# Patient Record
Sex: MALE | Race: WHITE
[De-identification: names, ages, dates, MRNs, and addresses within clinical notes are randomized per-mention and may not be internally consistent; named-entity substitution may affect disease eponyms.]

---

## 2017-04-19 NOTE — ED.PDOC
General


ED Provider: 


Dr. ELIO LAKHANI JR





Chief Complaint: Back Pain


Stated Complaint: hx 2 back surg--chronic back pain-stopped seeing pain 

management due to not effective-recent flare up with pain started 2 weeks ago--

no recent injury[End]97.3 80 16 97% 137/83 9/10  patient notes only 

medicationof benefit has been norco 10 no benefit from non opiates no benefit 

ultram has had MR carnes last surgery wants referral to surgeon but unable to 

see PMD


Time Seen by Physician: 10:29


Mode of Arrival: Walk-In


Information Source: Patient


Exam Limitations: No limitations


Primary Care Provider: 


LEONEL ALBERTS





Nursing and Triage Documentation Reviewed and Agree: No





Musculoskeletal Complaint Exam





- Back Pain Complaint/Exam


Mechanism of Injury: Reports: No known trauma


Symptoms Are: Worse


Timing: Intermittent


Initial Severity: Moderate


Current Severity: Moderate


Location: Reports: Diffuse


Character: Reports: Sharp, Aching


Aggravating: Reports: Movements, Lifting


Alleviating: Reports: Rest


Associated Signs and Symptoms: Reports: Numbness, Tingling.  Denies: Swelling, 

Redness, Bruising, Fever, Weakness, Abdominal pain, Flank pain, Bladder 

incontinence, Bowel incontinence, Weight loss, Pain with weight bearing


Related History: Reports: Previous back injury


TAD Risk Factors: Reports: Smoking


AAA Risk Factors: Reports: Smoking


Cauda Equina Risk Factors: Reports: Lower extremity numbness


Epidural Abcess Risk Factors: Reports: Lower extremity numbness


Related Surgical History: Reports: Back Surgery, Fusion


Focal Tenderness: No


Paraspinal Muscle Tenderness: Yes


Paraspinal Muscle Spasm: Yes


Scoliosis: No


Lordosis: No


Kyphosis: No


SLR Test: Right Negative, Left Negative


Hip Motion Testing Pain: Right Negative, Left Negative (pain nonfocal able to 

tolerate exam without difficulty)


Focal Weakness: Present: None


Focal Sensory Loss: Present: None


Gait: Present: Normal


Differential Diagnoses: Epidural Abcess, Neoplasm, Strain, Sprain





Review of Systems





- Review Of Systems


Constitutional: Reports: No symptoms


Eyes: Reports: No symptoms


Ears, Nose, Mouth, Throat: Reports: No symptoms


Respiratory: Reports: No symptoms


Cardiac: Reports: No symptoms


GI: Reports: No symptoms


: Reports: No symptoms


Musculoskeletal: Reports: Back pain, Muscle pain, Muscle stiffness


Skin: Reports: No symptoms


Neurological: Reports: No symptoms


Endocrine: Reports: No symptoms


Hematologic/Lymphatic: Reports: No symptoms


All Other Systems: Other





Past Medical History





- Past Medical History


Previously Healthy: Yes


Endocrine: Reports: None


Cardiovascular: Reports: None


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


Hematological: Reports: None


Gastrointestinal: Reports: None


Genitourinary: Reports: None


Neuro/Psych: Reports: None


Musculoskeletal: Reports: Back Pain


Cancer: Reports: Other (prostate ca-- AGE 46)





- Surgical History


General Surgical History: Reports: Back Surgery (twice), Other (prostates 

surgery)





- Family History


Family History: Reports: None





- Social History


Smoking Status: Current every day smoker, Heavy tobacco smoker


Hx Substance Use: No


Alcohol Screening: None





Physical Exam





- Physical Exam


Appearance: Well-appearing, Thin


Pain Distress: Moderate


Eyes: KALA, EOMI, Conjunctiva clear


ENT: Ears normal, Nose normal, Oropharynx normal


Neck: Supple


Respiratory: Airway patent, Breath sounds clear, Breath sounds equal, 

Respirations nonlabored


Cardiovascular: RRR, Pulses normal, No rub, No murmur


GI/: Soft, Nontender, No masses, Bowel sounds normal, No Organomegaly


Musculoskeletal: Normal strength, ROM intact, No edema, No calf tenderness


Skin: Warm, Dry, Normal color


Neurological: Sensation intact, Motor intact, Reflexes intact (complains of 

pain with SLR but non focal able to tolerate exam withou difficulty-nonfocal), 

Cranial nerves intact, Alert, Oriented


Psychiatric: Affect appropriate, Mood appropriate





Critical Care Note





- Critical Care Note


Total Time (mins): 0





Course





- Course


Orders, Labs, Meds: 


Lab Review











  04/19/17





  10:36


 


Urine Color  Yellow


 


Urine Clarity  Clear


 


Urine pH  7.0


 


Ur Specific Gravity  1.010


 


Urine Protein  Negative


 


Urine Glucose (UA)  Negative


 


Urine Ketones  Negative


 


Urine Blood  Negative


 


Urine Nitrite  Negative


 


Urine Bilirubin  Negative


 


Urine Urobilinogen  0.2


 


Ur Leukocyte Esterase  Negative








Orders











 Category Date Time Status


 


 URINALYSIS C & S IF INDICATED Stat LAB  04/19/17 10:36 Completed


 


 CT LUMBAR SPINE W/O CONTRAST Stat RADS  04/19/17 10:49 Completed











Vital Signs: 


 











  Temp Pulse Resp BP Pulse Ox


 


 04/19/17 09:49  97.3 F L  80  16  137/83  97














Departure





- Departure


Time of Disposition: 11:33


Disposition: HOME SELF-CARE


Discharge Problem: 


 Acute exacerbation of chronic low back pain





Instructions:  Lower Back Exercises (ED), Chronic Back Pain (ED), Acute Low 

Back Pain (ED), Low Back Strain (ED)


Condition: Good


Pt referred to PMD for follow-up: Yes


Additional Instructions: 


Naprosyn for pain recommend use for three to five days for any inflammation


Norco for pain not controlled


should resolve with medications and stretching exercises


follow up with PMD discuss specialty referral or MRI if indicated


return if fever over 101.0, if worsening


Prescriptions: 


Hydrocodone Bit/Acetaminophen [Norco 5-325] 1 - 2 tab PO Q6HR PRN #12 tablet


 PRN Reason: pain


Naproxen [Naprosyn] 500 mg PO Q12HR PRN #30 tablet


 PRN Reason: PAIN


Allergies/Adverse Reactions: 


Allergies





No Known Allergies Allergy (Unverified 04/19/17 09:55)


 








Home Medications: 


Ambulatory Orders





Hydrocodone Bit/Acetaminophen [Norco 5-325] 1 - 2 tab PO Q6HR PRN #12 tablet 04/ 19/17 


Lansoprazole [Prevacid] 30 mg PO DAILY 04/19/17 


Naproxen [Naprosyn] 500 mg PO Q12HR PRN #30 tablet 04/19/17

## 2017-04-19 NOTE — CT
EXAM:  CT of the lumbar spine without contrast 

  

History:  Worsening back pain. 

  

Comparison:  None available. 

  

Technique:  Multiplanar CT images through the lumbar spine were obtained without the administration 
of IV contrast 

  

Findings:  No acute fracture or subluxation.  Anterior fusion of L5-S1 with grossly intact hardware.
  Mild to moderate degenerative disc disease at L2-L3 with endplate sclerosis and osteophyte formati
on with some vacuum disc phenomenon. 

  

T12-L1: No significant disc bulge, central canal stenosis or bony neural foraminal narrowing. 

  

L1-L2:  No significant disc bulge, central canal stenosis or bony neural foraminal narrowing. 

  

L2-L3: No significant disc bulge, central canal stenosis or bony neural foraminal narrowing. 

  

L3-L4: No significant disc bulge, central canal stenosis or bony neural foraminal narrowing. 

  

L4-L5:  Small disc bulge effacing the anterior thecal sac with no significant central canal stenosis
 or bony neural foraminal narrowing. 

  

L5-S1:  No significant bony central canal stenosis.  Mild to moderate bilateral bony neural foramina
l narrowing secondary to facet hypertrophy. 

  

Impression:  No acute osseous abnormality of the lumbar spine.  Other findings as detailed above.

## 2017-04-26 NOTE — ED.PDOC
General


ED Provider: 


Dr. KAMI CASTRO





Chief Complaint: Back Pain


Stated Complaint: low back pain


Time Seen by Physician: 09:45


Mode of Arrival: Walk-In


Information Source: Patient


Exam Limitations: No limitations


Primary Care Provider: 


LEONEL ALBERTS





Nursing and Triage Documentation Reviewed and Agree: Yes





Musculoskeletal Complaint Exam





- Back Pain Complaint/Exam


Mechanism of Injury: Reports: No known trauma


Onset/Duration:  chronic 


Symptoms Are: Still present


Timing: Intermittent


Episodes Lasting: Days


Initial Severity: Moderate


Current Severity: Moderate


Character: Reports: Throbbing, Spasmodic, Stiffness


Aggravating: Reports: Movements, Lifting, Bending, Walking


Alleviating: Reports: Rest, Position


Associated Signs and Symptoms: Denies: Swelling, Redness, Bruising, Fever, 

Weakness, Numbness, Tingling, Abdominal pain, Flank pain, Bladder incontinence, 

Bowel incontinence, Weight loss, Pain with weight bearing


Related History: Reports: Similar episode


TAD Risk Factors: Reports: None


AAA Risk Factors: Reports: None


Cauda Equina Risk Factors: Reports: None


Epidural Abcess Risk Factors: Reports: None


Related Surgical History: Reports: None


Focal Tenderness: No


Paraspinal Muscle Tenderness: No


Paraspinal Muscle Spasm: No


Scoliosis: No


Lordosis: No


Kyphosis: No


SLR Test: Right Negative, Left Negative


Hip Motion Testing Pain: Right Negative, Left Negative


Focal Weakness: Present: None


Focal Sensory Loss: Present: None


Gait: Present: Normal


Differential Diagnoses: Strain, Sprain





Review of Systems





- Review Of Systems


Constitutional: Reports: No symptoms


Eyes: Reports: No symptoms


Ears, Nose, Mouth, Throat: Reports: No symptoms


Respiratory: Reports: No symptoms


Cardiac: Reports: No symptoms


GI: Reports: No symptoms


: Reports: No symptoms


Musculoskeletal: Reports: Back pain


Skin: Reports: No symptoms


Neurological: Reports: No symptoms


Endocrine: Reports: No symptoms


Hematologic/Lymphatic: Reports: No symptoms


All Other Systems: Reviewed and Negative





Past Medical History





- Past Medical History


Previously Healthy: Yes


Endocrine: Reports: None


Cardiovascular: Reports: None


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


Hematological: Reports: None


Gastrointestinal: Reports: None


Genitourinary: Reports: None


Neuro/Psych: Reports: None


Musculoskeletal: Reports: Back Pain


Cancer: Reports: Other (prostate ca-- AGE 46)


Other Pertinent Past Medical History: back surg--prostate ca--TURBURCULOSIS AT 

AGE 4--chronic back paincance





- Surgical History


General Surgical History: Reports: Back Surgery (twice), Other (prostates 

surgery)





- Family History


Family History: Reports: None





- Social History


Smoking Status: Current every day smoker


Hx Substance Use: No


Alcohol Screening: None





Physical Exam





- Physical Exam


Appearance: Well-appearing, No pain distress, Well-nourished


Eyes: KALA, EOMI, Conjunctiva clear


ENT: Ears normal, Nose normal, Oropharynx normal


Respiratory: Airway patent, Breath sounds clear, Breath sounds equal, 

Respirations nonlabored


Cardiovascular: RRR, Pulses normal, No rub, No murmur


GI/: Soft, Nontender, No masses, Bowel sounds normal, No Organomegaly


Musculoskeletal: Normal strength, ROM intact, No edema, No calf tenderness


Skin: Warm, Dry, Normal color


Neurological: Sensation intact, Motor intact, Reflexes intact, Cranial nerves 

intact, Alert, Oriented


Psychiatric: Affect appropriate, Mood appropriate





Critical Care Note





- Critical Care Note


Total Time (mins): 0





Course





- Course


Vital Signs: 





 











  Temp Pulse Resp BP Pulse Ox


 


 04/26/17 09:43  96.7 F L  79  20  148/89 H  98














Departure





- Departure


Time of Disposition: 10:02


Disposition: HOME SELF-CARE


Discharge Problem: 


 Backache





Instructions:  Back Pain (ED), Chronic Back Pain (ED)


Condition: Good


Pt referred to PMD for follow-up: No


Additional Instructions: 


Please call your Family Physician as soon as possible to schedule a follow-up 

appointment.


Allergies/Adverse Reactions: 


Allergies





No Known Allergies Allergy (Verified 04/26/17 09:50)


 








Home Medications: 


Ambulatory Orders





Lansoprazole [Prevacid] 30 mg PO DAILY 04/19/17 


Naproxen [Naprosyn] 500 mg PO Q12HR PRN #30 tablet 04/19/17

## 2017-05-08 NOTE — ED.PDOC
General


ED Provider: 


Dr. KAMI CASTRO





Chief Complaint: Back Pain


Stated Complaint: back pain


Time Seen by Physician: 15:30


Mode of Arrival: Walk-In


Information Source: Patient


Exam Limitations: No limitations


Primary Care Provider: 


LEONEL ALBERTS





Nursing and Triage Documentation Reviewed and Agree: Yes





Musculoskeletal Complaint Exam





- Back Pain Complaint/Exam


Mechanism of Injury: Reports: No known trauma


Onset/Duration: chronic


Symptoms Are: Still present


Timing: Intermittent


Initial Severity: Moderate


Current Severity: Moderate


Character: Reports: Aching


Aggravating: Reports: None


Alleviating: Reports: None


Associated Signs and Symptoms: Denies: Swelling, Redness, Bruising, Fever, 

Weakness, Numbness, Tingling, Abdominal pain, Flank pain, Bladder incontinence, 

Bowel incontinence, Weight loss, Pain with weight bearing


Related History: Reports: Similar episode


TAD Risk Factors: Reports: None


AAA Risk Factors: Reports: None


Cauda Equina Risk Factors: Reports: None


Epidural Abcess Risk Factors: Reports: None


Related Surgical History: Reports: None


Focal Tenderness: No


SLR Test: Right Negative, Left Negative


Hip Motion Testing Pain: Right Negative, Left Negative


Focal Weakness: Present: None


Focal Sensory Loss: Present: None


Gait: Present: Normal


Differential Diagnoses: Strain, Sprain





Review of Systems





- Review Of Systems


Constitutional: Reports: No symptoms


Eyes: Reports: No symptoms


Ears, Nose, Mouth, Throat: Reports: No symptoms


Respiratory: Reports: No symptoms


Cardiac: Reports: No symptoms


GI: Reports: No symptoms


: Reports: No symptoms


Musculoskeletal: Reports: Back pain


Skin: Reports: No symptoms


Neurological: Reports: No symptoms


Endocrine: Reports: No symptoms


Hematologic/Lymphatic: Reports: No symptoms


All Other Systems: Reviewed and Negative





Past Medical History





- Past Medical History


Previously Healthy: Yes


Endocrine: Reports: None


Cardiovascular: Reports: None


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


Hematological: Reports: None


Gastrointestinal: Reports: None


Genitourinary: Reports: None


Neuro/Psych: Reports: None


Musculoskeletal: Reports: Back Pain


Cancer: Reports: Other (prostate ca-- AGE 46)


Other Pertinent Past Medical History: back surg--prostate ca--TURBURCULOSIS AT 

AGE 4--chronic back paincance





- Surgical History


General Surgical History: Reports: Back Surgery (twice), Other (prostates 

surgery)





- Family History


Family History: Reports: None





- Social History


Smoking Status: Current every day smoker


Hx Substance Use: No


Alcohol Screening: None





Physical Exam





- Physical Exam


Appearance: Well-appearing, No pain distress, Well-nourished


Eyes: KALA, EOMI, Conjunctiva clear


ENT: Ears normal, Nose normal, Oropharynx normal


Respiratory: Airway patent, Breath sounds clear, Breath sounds equal, 

Respirations nonlabored


Cardiovascular: RRR, Pulses normal, No rub, No murmur


GI/: Soft, Nontender, No masses, Bowel sounds normal, No Organomegaly


Musculoskeletal: Normal strength, ROM intact, No edema, No calf tenderness


Skin: Warm, Dry, Normal color


Neurological: Sensation intact, Motor intact, Reflexes intact, Cranial nerves 

intact, Alert, Oriented


Psychiatric: Affect appropriate, Mood appropriate





Critical Care Note





- Critical Care Note


Total Time (mins): 0





Course





- Course


Vital Signs: 





 











  Temp Pulse Resp BP Pulse Ox


 


 05/08/17 15:24  97.9 F  88  18  124/89  97














Departure





- Departure


Time of Disposition: 15:39


Disposition: HOME SELF-CARE


Discharge Problem: 


Back pain


Qualifiers:


 Back pain location: low back pain Back pain laterality: midline Sciatica 

presence: without sciatica 





Instructions:  Chronic Back Pain (ED)


Condition: Good


Pt referred to PMD for follow-up: No


Additional Instructions: 


Please call your Family Physician as soon as possible to schedule a follow-up 

appointment.


Prescriptions: 


Hydrocodone/Acetaminophen [Norco 5-325 Tablet] 1 each PO Q6HR PRN #20 tablet


 PRN Reason: PAIN


Allergies/Adverse Reactions: 


Allergies





No Known Allergies Allergy (Verified 05/08/17 15:22)


 








Home Medications: 


Ambulatory Orders





Lansoprazole [Prevacid] 30 mg PO DAILY 04/19/17 


Naproxen [Naprosyn] 500 mg PO Q12HR PRN #30 tablet 04/19/17 


Hydrocodone/Acetaminophen [Norco 5-325 Tablet] 1 each PO Q6HR PRN #20 tablet 05/ 08/17

## 2017-05-14 NOTE — ED.PDOC
General


ED Provider: 


Dr. BARBARA MARC





Chief Complaint: Back Pain


Stated Complaint: Has chronic back pain, he is not going to the pain management.


Time Seen by Physician: 08:52


Mode of Arrival: Walk-In


Information Source: Patient


Primary Care Provider: 


LEONEL ALBERTS





Nursing and Triage Documentation Reviewed and Agree: Yes





Musculoskeletal Complaint Exam





- Back Pain Complaint/Exam


Mechanism of Injury: Reports: No known trauma


Symptoms Are: Still present


Timing: Constant


Initial Severity: Moderate


Current Severity: Moderate


Location: Reports: Discrete


Character: Reports: Aching, Throbbing


Aggravating: Reports: Movements, Lifting


Alleviating: Reports: None


Associated Signs and Symptoms: Denies: Swelling, Redness, Bruising, Fever, 

Weakness, Numbness, Tingling, Abdominal pain, Flank pain, Bladder incontinence, 

Bowel incontinence, Weight loss, Pain with weight bearing


Related History: Reports: Similar episode


TAD Risk Factors: Reports: None


AAA Risk Factors: Reports: None


Cauda Equina Risk Factors: Reports: None


Epidural Abcess Risk Factors: Reports: None


Related Surgical History: Reports: None


Focal Tenderness: Yes


Paraspinal Muscle Tenderness: Yes


Paraspinal Muscle Spasm: Yes


Scoliosis: No


Lordosis: No


Kyphosis: No


SLR Test: Right Negative, Left Negative


Hip Motion Testing Pain: Right Negative, Left Negative


Focal Weakness: Present: None


Focal Sensory Loss: Present: None


Gait: Present: Normal


Differential Diagnoses: Arthritis, Strain





Review of Systems





- Review Of Systems


Constitutional: Reports: No symptoms


Eyes: Reports: No symptoms


Ears, Nose, Mouth, Throat: Reports: No symptoms


Respiratory: Reports: No symptoms


Cardiac: Reports: No symptoms


GI: Reports: No symptoms


: Reports: No symptoms


Musculoskeletal: Reports: Back pain


Skin: Reports: No symptoms


Neurological: Reports: No symptoms


Endocrine: Reports: No symptoms


Hematologic/Lymphatic: Reports: No symptoms


All Other Systems: Reviewed and Negative





Past Medical History





- Past Medical History


Previously Healthy: Yes


Endocrine: Reports: None


Cardiovascular: Reports: None


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


Hematological: Reports: None


Gastrointestinal: Reports: None


Genitourinary: Reports: None


Neuro/Psych: Reports: None


Musculoskeletal: Reports: Back Pain


Cancer: Reports: Other (prostate ca-- AGE 46)


Other Pertinent Past Medical History: back surg--prostate ca--TURBURCULOSIS AT 

AGE 4--chronic back paincance





- Surgical History


General Surgical History: Reports: Back Surgery (twice), Other (prostates 

surgery)





- Family History


Family History: Reports: None





- Social History


Smoking Status: Current every day smoker, Heavy tobacco smoker


Smoking Cessation Counseling Time: > 3 min - 10 min


Hx Substance Use: No


Alcohol Screening: None





- Immunizations


Tetanus Shot up to Date: Yes





Physical Exam





- Physical Exam


Appearance: Well-appearing, No pain distress, Well-nourished


Eyes: KALA, EOMI, Conjunctiva clear


ENT: Ears normal, Nose normal, Oropharynx normal


Respiratory: Airway patent, Breath sounds clear, Breath sounds equal, 

Respirations nonlabored


Cardiovascular: RRR, Pulses normal, No rub, No murmur


GI/: Soft, Nontender, No masses, Bowel sounds normal, No Organomegaly


Musculoskeletal: Normal strength, ROM intact, No edema, No calf tenderness


Skin: Warm, Dry, Normal color


Neurological: Sensation intact, Motor intact, Reflexes intact, Cranial nerves 

intact, Alert, Oriented


Psychiatric: Affect appropriate, Mood appropriate





Critical Care Note





- Critical Care Note


Total Time (mins): 0





Course





- Course


Orders, Labs, Meds: 





Orders











 Category Date Time Status


 


 Ketorolac Tromethamine [Toradol] MEDS  05/14/17 08:51 Stat





 30 mg IM ONCE STA   








Medications











Generic Name Dose Route Start Last Admin





  Trade Name Freq  PRN Reason Stop Dose Admin


 


Ketorolac Tromethamine  30 mg  05/14/17 08:51  





  Toradol  IM  05/14/17 08:52  





  ONCE STA   











Vital Signs: 





 











  Temp Pulse Resp BP Pulse Ox


 


 05/14/17 08:32  97.5 F L  77  16  149/85 H  97














Departure





- Departure


Time of Disposition: 08:59


Disposition: HOME SELF-CARE


Discharge Problem: 


 Backache





Instructions:  Lumbar Radiculopathy (ED)


Condition: Stable


Pt referred to PMD for follow-up: Yes (spine surgeon)


Additional Instructions: 


Patient has f/u with spine surgeon.


rest


hot pack





Prescriptions: 


Prednisone 5 mg PO BIDWM #14 tablet


Tramadol HCl 50 mg PO BID #14 tablet


Allergies/Adverse Reactions: 


Allergies





No Known Allergies Allergy (Verified 05/14/17 08:39)


 








Home Medications: 


Ambulatory Orders





Lansoprazole [Prevacid] 30 mg PO DAILY 04/19/17 


Naproxen [Naprosyn] 500 mg PO Q12HR PRN #30 tablet 04/19/17 


Prednisone 5 mg PO BIDWM #14 tablet 05/14/17 


Tramadol HCl 50 mg PO BID #14 tablet 05/14/17 








Disposition Discussed With: Patient

## 2018-07-11 ENCOUNTER — HOSPITAL ENCOUNTER (EMERGENCY)
Dept: HOSPITAL 58 - ED | Age: 53
Discharge: HOME | End: 2018-07-11
Payer: COMMERCIAL

## 2018-07-11 VITALS — TEMPERATURE: 97.6 F | SYSTOLIC BLOOD PRESSURE: 145 MMHG | DIASTOLIC BLOOD PRESSURE: 82 MMHG

## 2018-07-11 VITALS — BODY MASS INDEX: 24.3 KG/M2

## 2018-07-11 DIAGNOSIS — S83.411A: Primary | ICD-10-CM
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
## 2018-07-11 DIAGNOSIS — R10.30: ICD-10-CM

## 2018-07-11 DIAGNOSIS — F17.210: ICD-10-CM

## 2018-07-11 PROCEDURE — 99283 EMERGENCY DEPT VISIT LOW MDM: CPT

## 2018-07-11 PROCEDURE — 81001 URINALYSIS AUTO W/SCOPE: CPT

## 2018-07-11 PROCEDURE — 96372 THER/PROPH/DIAG INJ SC/IM: CPT

## 2018-07-11 NOTE — ED.PDOC
General


ED Provider: 


Dr. PATRICIO NICHOLAS





Chief Complaint: Knee Pain/Injury


Stated Complaint: patient is a 52 year old male who states he has had right 

knee pain for the past 10 days after he bent it. Has pain with weight bearing. 

Has Appointment with PCP in one week.  pain got worse today. Also complains of 

right upper inguinal area pain for the past few days that is not associated 

with bowel movement. States he has been lifting alot.


Time Seen by Physician: 19:10


Mode of Arrival: Walk-In


Information Source: Patient


Exam Limitations: No limitations


Primary Care Provider: 


LEONEL ALBERTS





Nursing and Triage Documentation Reviewed and Agree: Yes


Does patient meet sepsis criteria?: No


System Inflammatory Response Syndrome: Not Applicable


Sepsis Protocol: 


For patient's 13 years and over:





Temp is 96.8 and below  and greater


Pulse >90 BPM


Resp >20/minute


Acutely Altered Mental Status





Are patient's symptoms suggestive of a new infection, such as:


   -Pneumonia


   -Skin, Soft Tissue


   -Endocarditis


   -UTI


   -Bone, Joint Infection


   -Implantable Device


   -Acute Abdominal Infection


   -Wound Infection


   -Meningitis


   -Blood Stream Catheter Infection


   -Unknown








Musculoskeletal Complaint Exam





- Knee Pain Complaint/Exam


Mechanism of Injury: Reports: Trauma (from squating )


Onset/Duration: 3 days ago 


Symptoms Are: Still present


Onset of Pain: Reports: Immediate


Initial Severity: Moderate


Current Severity: Moderate


Location: Reports: Discrete (Medial and posterior aspect of the knee. )


Character: Reports: Aching, Throbbing


Aggravating: Reports: Movement, Weight bearing, Prolonged standing


Able to Bear Weight: Yes


Related History: Reports: Occupational injury (but does not want to claim it as 

such ).  Denies: Similar episode


Septic Arthritis Risk Factors: Reports: None


Gout Risk Factors: Reports: None


Knee Findings: Present: Tenderness (Tenderness to palpation right medical 

colateral ligament. ).  Absent: Rotation, Ligamentous instability





Review of Systems





- Review Of Systems


Constitutional: Reports: No symptoms


Eyes: Reports: No symptoms


Ears, Nose, Mouth, Throat: Reports: No symptoms


Respiratory: Reports: No symptoms


Cardiac: Reports: No symptoms


GI: Reports: Abdominal pain (Right inguinal area.)


: Reports: No symptoms


Musculoskeletal: Reports: Joint pain (Right knee )


Skin: Reports: No symptoms


Neurological: Reports: No symptoms


Endocrine: Reports: No symptoms


Hematologic/Lymphatic: Reports: No symptoms


All Other Systems: Reviewed and Negative





Past Medical History





- Past Medical History


Previously Healthy: Yes


Endocrine: Reports: None


Cardiovascular: Reports: None


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


Hematological: Reports: None


Gastrointestinal: Reports: None


Genitourinary: Reports: None


Neuro/Psych: Reports: None


Musculoskeletal: Reports: Back Pain


Cancer: Reports: Other (prostate ca-- AGE 46)


Other Pertinent Past Medical History: back surg--prostate ca--TURBURCULOSIS AT 

AGE 4--chronic back paincance





- Surgical History


General Surgical History: Reports: Back Surgery (twice), Other (prostates 

surgery)





- Family History


Family History: Reports: None





- Social History


Smoking Status: Current every day smoker, Heavy tobacco smoker


Hx Substance Use: No


Alcohol Screening: None





Physical Exam





- Physical Exam


Appearance: Well-appearing


Ill-appearing: Moderate


Pain Distress: Moderate


Neck: Supple


Respiratory: Airway patent, Breath sounds clear, Breath sounds equal, 

Respirations nonlabored


Cardiovascular: RRR, Pulses normal, No rub, No murmur


GI/: Tender (Right inguinal area. No evidence of hernia or incarceration )


Musculoskeletal: Normal strength, ROM intact (Tenderness to palpation right 

medical colateral ligament. )


Skin: Warm, Dry, Normal color


Neurological: Sensation intact, Motor intact, Reflexes intact, Cranial nerves 

intact, Alert, Oriented





Interpretation





- Radiology Interpretation


Radiology Interpretation By: ED Physician


Radiology Results: Negative


Exam Interpreted: Other (Knee x ray right )


Radiology Interpretation By: Radiologist


Radiology Results: Negative


Exam Interpreted: CT Scan (CT pelvis )





Critical Care Note





- Critical Care Note


Total Time (mins): 0





Course





- Course


Orders, Labs, Meds: 


Lab Review











  07/11/18





  19:45


 


Urine Color  Yellow


 


Urine Clarity  Clear


 


Urine pH  6.0


 


Ur Specific Gravity  1.020


 


Urine Protein  Negative


 


Urine Glucose (UA)  Negative


 


Urine Ketones  Negative


 


Urine Blood  Negative


 


Urine Nitrite  Negative


 


Urine Bilirubin  Negative


 


Urine Urobilinogen  0.2


 


Ur Leukocyte Esterase  Negative








Orders











 Category Date Time Status


 


 ACE [ED ACE WRAP] .ONCE EMERGENCY  07/11/18 19:35 Active


 


 URINALYSIS C & S IF INDICATED Stat LAB  07/11/18 19:45 Completed


 


 Ketorolac Tromethamine [Toradol] MEDS  07/11/18 19:41 Discontinued





 60 mg IM ONCE STA   


 


 CT PELVIS W/O CONTRAST Stat RADS  07/11/18 19:34 Taken


 


 KNEE, RIGHT 4 VIEWS Stat RADS  07/11/18 19:07 Taken








Medications














Discontinued Medications














Generic Name Dose Route Start Last Admin





  Trade Name Freq  PRN Reason Stop Dose Admin


 


Ketorolac Tromethamine  60 mg  07/11/18 19:41  07/11/18 19:52





  Toradol  IM  07/11/18 19:42  60 mg





  ONCE STA   Administration





     





     





     





     











Vital Signs: 


 











  Temp Pulse Resp BP Pulse Ox


 


 07/11/18 18:57  97.6 F  84  20  145/82 H  98














Departure





- Departure


Time of Disposition: 20:13


Disposition: HOME SELF-CARE


Discharge Problem: 


Right knee sprain


Qualifiers:


 Encounter type: initial encounter Involved ligament of knee: medial collateral 

ligament Qualified Code(s): S83.411A - Sprain of medial collateral ligament of 

right knee, initial encounter





Instructions:  Knee Sprain (ED)


Condition: Stable


Pt referred to PMD for follow-up: Yes


IPMP verified?: No


Additional Instructions: 


Take Medications as prescribed 


Follow up with PCP as scheduled 


Prescriptions: 


Tramadol HCl [Ultram] 50 mg PO Q6H PRN #14 tablet


 PRN Reason: Severe Pain


Allergies/Adverse Reactions: 


Allergies





No Known Allergies Allergy (Verified 07/11/18 19:05)


 








Home Medications: 


Ambulatory Orders





Tramadol HCl [Ultram] 50 mg PO Q6H PRN #14 tablet 07/11/18 








Disposition Discussed With: Patient, Family

## 2018-07-11 NOTE — CT
Exam:  CT scan of the pelvis without contrast. 

  

Date:  07/11/2018. 

  

Comparison:  None. 

  

HISTORY:  Right lower quadrant and knee pain. 

  

TECHNIQUE:  Helical scan of the pelvis was performed without contrast. 

  

FINDINGS:  The study began just above the iliac crests.  The soft tissue and musculature overlying th
e bony pelvis are normal.  The small bowel within the lower abdomen and pelvis appear normal.  The ap
pendix and colon within the lower abdomen pelvis is normal.  The pelvic sidewall is normal.  The blad
daphnie and rectum are normal.  There is no free pelvic fluid.  Inguinal regions are normal. 

  

In L5-S1 anterior interbody fusion is present.  The bony pelvis and left hip are normal.  There is tucker
bchondral cysts along the medial margin of the right acetabulum. 

  

Impression:  No acute findings in the pelvis. 

  

Degenerative changes in the right hip.

## 2018-07-12 NOTE — DI
EXAM:  Radiographs, right knee 

  

HISTORY:  Right knee pain. 

  

COMPARISON:  None available. 

  

TECHNIQUE:  Four views. 

  

  

FINDINGS:  Bone mineralization is normal.  There is no fracture or dislocation.  The joint spaces are
 maintained.  No focal soft tissue abnormality is seen. 

  

---------------------------- 

IMPRESSION: 

No fracture or dislocation.

## 2018-07-26 ENCOUNTER — HOSPITAL ENCOUNTER (EMERGENCY)
Dept: HOSPITAL 58 - ED | Age: 53
Discharge: HOME | End: 2018-07-26

## 2018-07-26 VITALS — TEMPERATURE: 97.6 F | SYSTOLIC BLOOD PRESSURE: 150 MMHG | DIASTOLIC BLOOD PRESSURE: 91 MMHG

## 2018-07-26 VITALS — BODY MASS INDEX: 23.5 KG/M2

## 2018-07-26 DIAGNOSIS — F17.210: ICD-10-CM

## 2018-07-26 DIAGNOSIS — T15.92XA: Primary | ICD-10-CM

## 2018-07-26 PROCEDURE — 99282 EMERGENCY DEPT VISIT SF MDM: CPT

## 2018-07-26 NOTE — ED.PDOC
General


ED Provider: 


Dr. LEONEL ALBERTS-ER





Chief Complaint: Eye Problem


Stated Complaint: i was grinding and i got something in my eye


Time Seen by Physician: 23:01


Mode of Arrival: Walk-In


Information Source: Patient


Exam Limitations: No limitations


Primary Care Provider: 


LEONEL ALBERTS





Nursing and Triage Documentation Reviewed and Agree: Yes


Does patient meet sepsis criteria?: No


System Inflammatory Response Syndrome: Not Applicable


Sepsis Protocol: 


For patient's 13 years and over:





Temp is 96.8 and below  and greater


Pulse >90 BPM


Resp >20/minute


Acutely Altered Mental Status





Are patient's symptoms suggestive of a new infection, such as:


   -Pneumonia


   -Skin, Soft Tissue


   -Endocarditis


   -UTI


   -Bone, Joint Infection


   -Implantable Device


   -Acute Abdominal Infection


   -Wound Infection


   -Meningitis


   -Blood Stream Catheter Infection


   -Unknown








EENT Complaint Exam





- Eye Complaint/Exam


Onset/Duration: a few hours


Symptoms Are: Still present


Timing: Constant


Initial Severity: Mild


Current Severity: Mild


Location: Left


Character: Reports: Dull, Throbbing, Foreign body sensation


Aggravating: Reports: Blinking


Alleviating: Reports: None


Associated Signs and Symptoms: Reports: Clear drainage


Related History: Reports: Foreign body


Eye Surgical History: Reports: None


Penetrating Injury Risk Factors: Grinding


Globe Rupture Risk Factors: None


Optic Artery Occlusion Risk Factors: None


Visual Field: Normal


Extraocular Movement: Normal


Orbit Findings: Normal


Globe Findings: Intact


Lid Findings: Normal


Corneal Findings: Clear


Fundi: Normal


Slit Lamp Used: No


Differential Diagnoses: Foreign Body





Review of Systems





- Review Of Systems


Constitutional: Reports: No symptoms


Eyes: Reports: Foreign body sensation, Inflammation, Pain


Ears, Nose, Mouth, Throat: Reports: No symptoms


Respiratory: Reports: No symptoms


Cardiac: Reports: No symptoms


GI: Reports: No symptoms


: Reports: No symptoms


Musculoskeletal: Reports: No symptoms


Skin: Reports: No symptoms


Neurological: Reports: No symptoms


Endocrine: Reports: No symptoms


Hematologic/Lymphatic: Reports: No symptoms


All Other Systems: Reviewed and Negative





Past Medical History





- Past Medical History


Previously Healthy: Yes


Endocrine: Reports: None


Cardiovascular: Reports: None


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


Hematological: Reports: None


Gastrointestinal: Reports: None


Genitourinary: Reports: None


Neuro/Psych: Reports: None


Musculoskeletal: Reports: Back Pain


Cancer: Reports: Other (prostate ca-- AGE 46)


Other Pertinent Past Medical History: back surg--prostate ca--TURBURCULOSIS AT 

AGE 4--chronic back paincance





- Surgical History


General Surgical History: Reports: Back Surgery (twice), Other (prostates 

surgery)





- Family History


Family History: Reports: None





- Social History


Smoking Status: Current every day smoker, Heavy tobacco smoker


Hx Substance Use: No


Alcohol Screening: None





- Immunizations


Tetanus Shot up to Date: Yes





Physical Exam





- Physical Exam


Appearance: Well-appearing


Pain Distress: Mild


Eyes: KALA, EOMI


ENT: Ears normal, Nose normal, Oropharynx normal


Neck: Supple


Respiratory: Airway patent, Breath sounds clear, Breath sounds equal, 

Respirations nonlabored


Cardiovascular: RRR, Pulses normal, No rub, No murmur


GI/: Soft, Nontender, No masses, Bowel sounds normal, No Organomegaly


Musculoskeletal: Normal strength, ROM intact, No edema, No calf tenderness


Skin: Warm, Dry, Normal color


Neurological: Sensation intact, Motor intact, Reflexes intact, Cranial nerves 

intact, Alert, Oriented


Psychiatric: Affect appropriate, Mood appropriate





Critical Care Note





- Critical Care Note


Total Time (mins): 0





Course





- Course


Orders, Labs, Meds: 





Orders











 Category Date Time Status


 


 Balanced Salt Solution [Eye-Stream] MEDS  07/26/18 22:46 Discontinued





 1 bottle OP ONCE STA   


 


 Tetracaine HCl/Pf [Tetracaine 0.5% Unit-Dose] MEDS  07/26/18 22:46 Discontinued





 2 drop OP ONCE STA   








Medications














Discontinued Medications














Generic Name Dose Route Start Last Admin





  Trade Name Freq  PRN Reason Stop Dose Admin


 


Eye Irrigation Solution  1 bottle  07/26/18 22:46  





  Eye-Stream  OP  07/26/18 22:47  





  ONCE STA   





     





     





     





     


 


Tetracaine HCl  2 drop  07/26/18 22:46  





  Tetracaine 0.5% Unit-Dose  OP  07/26/18 22:47  





  ONCE STA   





     





     





     





     











Vital Signs: 





 











  Temp Pulse Resp BP Pulse Ox


 


 07/26/18 22:45  97.6 F  79  20  150/91 H  98














Departure





- Departure


Time of Disposition: 23:03


Disposition: HOME SELF-CARE


Discharge Problem: 


Foreign body, eye


Qualifiers:


 Encounter type: initial encounter Laterality: left Qualified Code(s): T15.92XA 

- Foreign body on external eye, part unspecified, left eye, initial encounter





Instructions:  Eye Foreign Body (ED)


Condition: Good


Pt referred to PMD for follow-up: Yes


IPMP verified?: No


Additional Instructions: 


keep patch on eye--norco 7.5 q 4hrs prn pain#6--come to office at 9 to get eye 

doctor referral


Allergies/Adverse Reactions: 


Allergies





No Known Allergies Allergy (Verified 07/26/18 22:49)


 








Home Medications: 


Ambulatory Orders





1 [No Reported Medications]  07/26/18 








Disposition Discussed With: Patient